# Patient Record
Sex: FEMALE | Race: WHITE | URBAN - METROPOLITAN AREA
[De-identification: names, ages, dates, MRNs, and addresses within clinical notes are randomized per-mention and may not be internally consistent; named-entity substitution may affect disease eponyms.]

---

## 2023-04-14 ENCOUNTER — NURSE ONLY (OUTPATIENT)
Dept: HEMATOLOGY/ONCOLOGY | Facility: HOSPITAL | Age: 28
End: 2023-04-14
Attending: GENETIC COUNSELOR, MS
Payer: COMMERCIAL

## 2023-04-14 ENCOUNTER — GENETICS ENCOUNTER (OUTPATIENT)
Dept: GENETICS | Facility: HOSPITAL | Age: 28
End: 2023-04-14
Attending: GENETIC COUNSELOR, MS
Payer: COMMERCIAL

## 2023-04-14 DIAGNOSIS — Z84.81 FAMILY HISTORY OF GENETIC DISEASE CARRIER: Primary | ICD-10-CM

## 2023-04-14 DIAGNOSIS — Z80.3 FAMILY HISTORY OF BREAST CANCER: ICD-10-CM

## 2023-04-14 PROCEDURE — 36415 COLL VENOUS BLD VENIPUNCTURE: CPT

## 2023-04-14 PROCEDURE — 96040 HC GENETIC COUNSELING EA 30 MIN: CPT | Performed by: GENETIC COUNSELOR, MS

## 2023-04-25 ENCOUNTER — GENETICS ENCOUNTER (OUTPATIENT)
Dept: HEMATOLOGY/ONCOLOGY | Facility: HOSPITAL | Age: 28
End: 2023-04-25

## 2023-04-25 DIAGNOSIS — Z13.71 BRCA GENE MUTATION NEGATIVE IN FEMALE: Primary | ICD-10-CM

## 2023-04-25 NOTE — PROGRESS NOTES
Patient Name: Lilia Henry  YOB: 1995    Referring Provider:  Self/Family     Reason for Referral:  Ms. Geovanna Calderon had genetic testing performed on 4/14/2023 because of a maternal and paternal family history of breast cancer with a known familial CHEK2 c.470T>C (p.[id number]) pathogenic variant found in her mother and maternal grandmother. Genetic Testing Result:  Negative. Ms. Geovanna Calderon did not inherit the familial CHEK2 c.470T>C (p.[id number]) pathogenic variant. No other pathogenic variants were found in the following 14 genes on the [facility] breast cancer panel: ISA*, BARD1, BRCA1, BRCA2, BRIP1, CDH1, CHEK2, NF1*, PALB2, PTEN*, RAD51C, RAD51D, STK11, TP53. Please refer to the report from [facility] for additional testing information. These results were discussed with Ms. Geovanna Calderon via telephone on 4/25/2023. Summary and Plan:  These results indicate that Ms. Geovanna Calderon did not inherit the familial CHEK2 c.470T>C (p.[id number]) pathogenic variant (harmful genetic mutation), and is not at any increased risk, genetically, for CHEK2-associated cancers. Ms. Jodie Gore potential future biological children would not be at risk to inherit the familial CHEK2 c.470T>C (p.[id number]) pathogenic variant, as she does not carry the mutation and therefore, cannot pass it on. Additionally, no pathogenic variants associated with hereditary breast cancer syndromes were identified in the other genes analyzed. Ms. Geovanna Calderon should follow routine health and cancer screening recommendations as set forth for the general population unless other risk factors indicate otherwise. I encouraged Ms. Geovanna Calderon to share the genetic test results with other relatives so that they may discuss the implications of this information with their health providers. Ms. Geovanna Calderon should also contact me if there are changes in her personal and/or family history. Please do not hesitate to contact my office if you have any questions or concerns, [phone]. Pallavi Lawler MS, CGC